# Patient Record
Sex: FEMALE
[De-identification: names, ages, dates, MRNs, and addresses within clinical notes are randomized per-mention and may not be internally consistent; named-entity substitution may affect disease eponyms.]

---

## 2018-01-22 ENCOUNTER — HOSPITAL ENCOUNTER (EMERGENCY)
Dept: HOSPITAL 14 - H.ER | Age: 28
Discharge: HOME | End: 2018-01-22
Payer: COMMERCIAL

## 2018-01-22 VITALS
HEART RATE: 112 BPM | RESPIRATION RATE: 18 BRPM | TEMPERATURE: 99 F | DIASTOLIC BLOOD PRESSURE: 55 MMHG | SYSTOLIC BLOOD PRESSURE: 128 MMHG | OXYGEN SATURATION: 98 %

## 2018-01-22 VITALS — BODY MASS INDEX: 53.4 KG/M2

## 2018-01-22 DIAGNOSIS — J06.9: Primary | ICD-10-CM

## 2018-01-22 NOTE — ED PDOC
HPI: General Adult


Time Seen by Provider: 18 18:53


Chief Complaint (Nursing): Cough, Cold, Congestion


Chief Complaint (Provider): cough


History Per: Patient


Additional Complaint(s): 


27-year-old female presents to emergency department with cough, chest 

congestion and body aches started yesterday. Patient denies any shortness of 

breath or dyspnea on exertion. She states cough is productive of yellow sputum. 

Patient is being seen today with her 1-year-old son who also has similar 

symptoms.





Past Medical History


Reviewed: Historical Data, Nursing Documentation, Vital Signs


Vital Signs: 


 Last Vital Signs











Temp  99 F   18 18:06


 


Pulse  112 H  18 18:06


 


Resp  18   18 18:06


 


BP  128/55 L  18 18:06


 


Pulse Ox  98   18 18:59














- Medical History


PMH: No Chronic Diseases





- Surgical History


Surgical History:  (x 1)


Other surgeries: Surgery for pilonidal cyst





- Family History


Family History: States: No Known Family Hx





- Living Arrangements


Living Arrangements: With Family





- Social History


Current smoker - smoking cessation education provided: No


Alcohol: None


Drugs: Denies





- Home Medications


Home Medications: 


 Ambulatory Orders











 Medication  Instructions  Recorded


 


Valacyclovir HCl [Valtrex] 500 mg PO BID 16


 


Docusate [Colace] 100 mg PO BID #30 cap 16


 


Ibuprofen [Motrin Tab] 600 mg PO TID #30 tab 16


 


Oxycodone HCl/Acetaminophen 1 each PO Q4 PRN #30 tablet 16





[Percocet 5-325 mg Tablet]  


 


Amoxicillin/Clavulanate [Augmentin 1 tab PO BID #14 tab 16





875 MG-125 MG]  


 


Ciprofloxacin HCl/Dexameth 2 drop AS TID #0 drops.susp 16





[Ciprodex Otic Suspension]  


 


Albuterol HFA [Ventolin HFA 90 1 puff IH ASDIR #1 unit 18





mcg/actuation (8 g)]  


 


Azithromycin [Zithromax] 250 mg PO DAILY #6 tab 18


 


Benzonatate 200 mg PO TID PRN #20 capsule 18


 


Oseltamivir Phosphate [Tamiflu] 75 mg PO BID #10 capsule 18














- Allergies


Allergies/Adverse Reactions: 


 Allergies











Allergy/AdvReac Type Severity Reaction Status Date / Time


 


No Known Allergies Allergy   Verified 16 06:24














Review of Systems


ROS Statement: Except As Marked, All Systems Reviewed And Found Negative


Constitutional: Positive for: Other (body aches).  Negative for: Fever, Chills


Respiratory: Positive for: Cough, Sputum (yellow)


Gastrointestinal: Negative for: Nausea, Vomiting





Physical Exam





- Reviewed


Nursing Documentation Reviewed: Yes


Vital Signs Reviewed: Yes





- Physical Exam


Appears: Positive for: Well, Non-toxic, No Acute Distress


Skin: Negative for: Rash


Eye Exam: Positive for: Normal appearance


ENT: Negative for: Nasal Congestion, Pharyngeal Erythema


Cardiovascular/Chest: Positive for: Regular Rate, Rhythm


Respiratory: Positive for: Decreased Breath Sounds.  Negative for: Wheezing, 

Respiratory Distress


Neurologic/Psych: Positive for: Alert, Oriented





- Laboratory Results


Urine Pregnancy POC: Negative





- ECG


O2 Sat by Pulse Oximetry: 98


Pulse Ox Interpretation: Normal





- Other Rad


  ** CXR


X-Ray: Interpreted by Me, Viewed By Me


X-Ray Interpretation: no acute infiltrate





Medical Decision Making


Medical Decision Makin year old with cough





Plan:


Pregnancy test


CXR


Flu swab


Duoneb x 1





Chest x-ray is normal, Flu swab is negative. Patient's son is also being seen 

in ED and tested positive for flu B. Patient given prescription for Tamiflu, 

Zithromax, Tessalon Perles and Ventolin inhaler. Advised fluids, rest and 

NSAIDs for fever as needed. Advised follow-up with primary doctor in 1-2 days. 





Disposition





- Clinical Impression


Clinical Impression: 


 Upper respiratory infection








- Patient ED Disposition


Is Patient to be Admitted: No


Counseled Patient/Family Regarding: Studies Performed, Diagnosis, Need For 

Followup, Rx Given





- Disposition


Referrals: 


[facility] [Outside]


Disposition: Routine/Home


Disposition Time: 19:45


Condition: STABLE


Additional Instructions: 


Take prescription meds as directed. Take over-the-counter Tylenol and Motrin 

for fever and body aches. Rest and drink plenty of fluids. Follow up with 

primary doctor in 2-3 days.


Prescriptions: 


Albuterol HFA [Ventolin HFA 90 mcg/actuation (8 g)] 1 puff IH ASDIR #1 unit


Azithromycin [Zithromax] 250 mg PO DAILY #6 tab


Benzonatate 200 mg PO TID PRN #20 capsule


 PRN Reason: Cough


Oseltamivir Phosphate [Tamiflu] 75 mg PO BID #10 capsule


Instructions:  Upper Respiratory Infection (ED)


Forms:  [email or web address] (English), [facility] ED School/Work Excuse

## 2018-01-23 NOTE — RAD
HISTORY:

cough  



COMPARISON:

No prior.



TECHNIQUE:

Chest PA and lateral



FINDINGS:



LUNGS:

No active pulmonary disease.



PLEURA:

No significant pleural effusion identified. No pneumothorax apparent.



CARDIOVASCULAR:

Normal.



OSSEOUS STRUCTURES:

No significant abnormalities.



VISUALIZED UPPER ABDOMEN:

Normal.



OTHER FINDINGS:

None.



IMPRESSION:

No acute cardiopulmonary disease appreciated.

## 2018-06-27 ENCOUNTER — HOSPITAL ENCOUNTER (EMERGENCY)
Dept: HOSPITAL 14 - H.ER | Age: 28
Discharge: HOME | End: 2018-06-27
Payer: COMMERCIAL

## 2018-06-27 VITALS
RESPIRATION RATE: 16 BRPM | HEART RATE: 65 BPM | DIASTOLIC BLOOD PRESSURE: 76 MMHG | SYSTOLIC BLOOD PRESSURE: 120 MMHG | TEMPERATURE: 98.4 F | OXYGEN SATURATION: 98 %

## 2018-06-27 VITALS — BODY MASS INDEX: 53.4 KG/M2

## 2018-06-27 DIAGNOSIS — H57.12: Primary | ICD-10-CM

## 2018-06-27 DIAGNOSIS — R05: ICD-10-CM
